# Patient Record
Sex: MALE | Race: OTHER | HISPANIC OR LATINO | URBAN - METROPOLITAN AREA
[De-identification: names, ages, dates, MRNs, and addresses within clinical notes are randomized per-mention and may not be internally consistent; named-entity substitution may affect disease eponyms.]

---
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
## 2025-04-18 NOTE — ED BEHAVIORAL HEALTH ASSESSMENT NOTE - NS ED [facility] MED ROS CARDIOVASCULAR
Pt presents to ED w. intermittent sharp left sided chest pain, radiating to left arm starting yesterday. States this morning he had an episode of chest pain w/ associated diaphoresis. Pt reports he has been feeling "fatigued" for a few weeks. Mild dyspnea on exertion. Denies leg swelling, cough, congestion, fevers. States he had a cardiac cath ~3 yrs ago w/out findings. No complaints

## 2025-06-30 ENCOUNTER — NON-APPOINTMENT (OUTPATIENT)
Age: 20
End: 2025-06-30

## 2025-07-01 ENCOUNTER — APPOINTMENT (OUTPATIENT)
Age: 20
End: 2025-07-01

## 2025-09-11 ENCOUNTER — APPOINTMENT (OUTPATIENT)
Age: 20
End: 2025-09-11